# Patient Record
Sex: FEMALE | Race: BLACK OR AFRICAN AMERICAN | Employment: OTHER | URBAN - METROPOLITAN AREA
[De-identification: names, ages, dates, MRNs, and addresses within clinical notes are randomized per-mention and may not be internally consistent; named-entity substitution may affect disease eponyms.]

---

## 2019-02-09 ENCOUNTER — HOSPITAL ENCOUNTER (OUTPATIENT)
Dept: MAMMOGRAPHY | Age: 69
Discharge: HOME | End: 2019-02-09
Attending: FAMILY MEDICINE
Payer: MEDICARE

## 2019-02-09 DIAGNOSIS — Z12.39 BREAST SCREENING: ICD-10-CM

## 2019-02-09 PROCEDURE — 77067 SCR MAMMO BI INCL CAD: CPT

## 2019-02-27 ENCOUNTER — HOSPITAL ENCOUNTER (OUTPATIENT)
Dept: CT IMAGING | Age: 69
Discharge: HOME | End: 2019-02-27
Attending: OTOLARYNGOLOGY
Payer: MEDICARE

## 2019-02-27 DIAGNOSIS — H93.A1 PULSATILE TINNITUS, RIGHT EAR: ICD-10-CM

## 2019-02-27 PROCEDURE — 70480 CT ORBIT/EAR/FOSSA W/O DYE: CPT

## 2022-10-13 ENCOUNTER — TRANSCRIBE ORDERS (OUTPATIENT)
Dept: SCHEDULING | Age: 72
End: 2022-10-13

## 2022-10-13 DIAGNOSIS — Z12.31 VISIT FOR SCREENING MAMMOGRAM: Primary | ICD-10-CM

## 2022-10-18 ENCOUNTER — HOSPITAL ENCOUNTER (OUTPATIENT)
Dept: MAMMOGRAPHY | Age: 72
Discharge: HOME | End: 2022-10-21
Payer: MEDICARE

## 2022-10-18 DIAGNOSIS — Z12.31 VISIT FOR SCREENING MAMMOGRAM: ICD-10-CM

## 2022-10-18 PROCEDURE — 77063 BREAST TOMOSYNTHESIS BI: CPT

## 2023-07-03 ENCOUNTER — TRANSCRIBE ORDERS (OUTPATIENT)
Dept: SCHEDULING | Age: 73
End: 2023-07-03

## 2023-07-03 DIAGNOSIS — Z12.31 SCREENING MAMMOGRAM FOR HIGH-RISK PATIENT: Primary | ICD-10-CM

## 2023-10-23 ENCOUNTER — HOSPITAL ENCOUNTER (OUTPATIENT)
Dept: MAMMOGRAPHY | Age: 73
Discharge: HOME | End: 2023-10-26
Payer: MEDICARE

## 2023-10-23 VITALS — HEIGHT: 63 IN

## 2023-10-23 DIAGNOSIS — Z12.31 SCREENING MAMMOGRAM FOR HIGH-RISK PATIENT: ICD-10-CM

## 2023-10-23 PROCEDURE — 77063 BREAST TOMOSYNTHESIS BI: CPT

## 2024-09-26 ENCOUNTER — TRANSCRIBE ORDERS (OUTPATIENT)
Dept: SCHEDULING | Age: 74
End: 2024-09-26

## 2024-09-26 DIAGNOSIS — Z12.31 SCREENING MAMMOGRAM FOR HIGH-RISK PATIENT: Primary | ICD-10-CM

## 2024-11-01 ENCOUNTER — HOSPITAL ENCOUNTER (OUTPATIENT)
Dept: PHYSICAL THERAPY | Age: 74
Setting detail: RECURRING SERIES
Discharge: HOME | End: 2024-11-04
Payer: MEDICARE

## 2024-11-01 DIAGNOSIS — I89.0 LYMPHEDEMA: Primary | ICD-10-CM

## 2024-11-01 PROCEDURE — 97165 OT EVAL LOW COMPLEX 30 MIN: CPT

## 2024-11-01 NOTE — THERAPY EVALUATION
Krista Santizo  : 1950  Primary: [employer] (Medicare Managed)  Secondary:  [facility] @ [facility]  [address] [place] [address]  Phone: [phone]  Fax: [phone] Plan Frequency: 2,2,2,2    Certification Period Expiration Date: 25        Plan of Care/Certification Expiration Date:  Certification Period Expiration Date: 25      Frequency/Duration: Plan Frequency: 2,2,2,2      Time In/Out:   Time In: 1000  Time Out: 1100    OT Visit Info:  Plan Frequency: 2,2,2,2          OUTPATIENT OCCUPATIONAL THERAPY:Initial Assessment 2024  Episode: (BLE lymphedema)           Treatment Diagnosis:    Lymphedema  Medical/Referring Diagnosis:    Localized swelling of both lower extremities   No admission diagnoses are documented for this encounter.   Referring Physician:  Azeem Borjas PA MD Orders:  OT Eval and Treat   Return MD Appt:  TBD  Date of Onset:  Onset Date: 21     Allergies:  Hydrocodone-acetaminophen and Penicillins  Restrictions/Precautions:    None      Medications Last Reviewed:  2024     SUBJECTIVE   History of Injury/Illness (Reason for Referral):    TODAY:   10/7/2024 (Dr Renteria and SEAN Centeno: 3 mo follow up for BLE edema.  Patient reports hives upon wearing over-the-counter compression stockings.  She has therefore not been wearing them.  She has not been fitted professionally for stockings yet.  Continues to report scattered sharp and cramping pains in various areas of her legs, she is wondering if this could be due to arthritis.  Followed up on previous test including YUDI and echo which were both unremarkable.  It is likely that her edema is secondary to flatfeet, inhibited gait without full calf pump and more sedentary lifestyle.  Patient reports previously walking about 4 miles a day, she has since stopped doing this and spends most of her time working as a  which is mostly sitting.

## 2024-11-09 ENCOUNTER — HOSPITAL ENCOUNTER (OUTPATIENT)
Dept: MAMMOGRAPHY | Age: 74
Discharge: HOME | End: 2024-11-12
Payer: MEDICARE

## 2024-11-09 DIAGNOSIS — Z12.31 ENCOUNTER FOR SCREENING MAMMOGRAM FOR HIGH-RISK PATIENT: ICD-10-CM

## 2024-11-09 PROCEDURE — 77063 BREAST TOMOSYNTHESIS BI: CPT

## 2024-11-11 ENCOUNTER — HOSPITAL ENCOUNTER (OUTPATIENT)
Dept: PHYSICAL THERAPY | Age: 74
Setting detail: RECURRING SERIES
Discharge: HOME | End: 2024-11-14
Payer: MEDICARE

## 2024-11-11 DIAGNOSIS — I89.0 LYMPHEDEMA: Primary | ICD-10-CM

## 2024-11-11 PROCEDURE — 97535 SELF CARE MNGMENT TRAINING: CPT

## 2024-11-11 PROCEDURE — 97140 MANUAL THERAPY 1/> REGIONS: CPT

## 2024-11-14 NOTE — PROGRESS NOTES
Aquaphor for skin care. Cotton liners. 3 bandages each leg. Remove if painful.         Lymphedema:  PRETREATMENT AFFECTED LIMB(s): LOWER EXTREMITY       Date:   11/1/24           Right / Left              Groin    []      []              8 inches    []      []              4 inches    []      []           PoplitealSpace    []      [] 39  40            8 inches    []      [] 35  36            4 inches    []      [] 29  30            Ankle    []      [] 26  25            Instep    []      [] 20.5  21         Measurements are taken in centimeters:  2.54 cm = 1 inch   Cumulative circumferential volumetric graph measurements  Date  11/1/24             RLE total size 149.5             LLE total size 152                    Treatment/Session Summary:    Treatment Assessment:   Consider ordering velcro next session on 11/15/2024 if she reduces as planned.    Communication/Consultation:  None today  Equipment provided today:  None  Recommendations/Intent for next treatment session: Next visit will focus on CDT and MLD.    >Total Treatment Billable Duration: 60 minutes  OT Individual Minutes  Time In: 0900  Time Out: 1000  Minutes: 60    Ranulfo Sanchez OT     Charge Capture   Events  [employer]  Appt Desk  Attendance Report     Future Appointments   Date Time Provider Department Center   11/15/2024 10:00 AM Ranulfo Sanchez OT [facility]   11/19/2024  8:00 AM Ranulfo Sanchez OT [facility]   11/20/2024  8:00 AM Ranulfo Sanchez OT [facility]   12/4/2024 10:00 AM Ranulfo Sanchez OT [facility]   12/6/2024  9:00 AM Ranulfo Sanchez OT [facility]

## 2024-11-15 ENCOUNTER — HOSPITAL ENCOUNTER (OUTPATIENT)
Dept: PHYSICAL THERAPY | Age: 74
Setting detail: RECURRING SERIES
Discharge: HOME | End: 2024-11-18
Payer: MEDICARE

## 2024-11-15 PROCEDURE — 97535 SELF CARE MNGMENT TRAINING: CPT

## 2024-11-15 PROCEDURE — 97140 MANUAL THERAPY 1/> REGIONS: CPT

## 2024-11-15 NOTE — PROGRESS NOTES
Aquaphor for skin care. Cotton liners. 3 bandages each leg. Remove if painful.         Lymphedema:  PRETREATMENT AFFECTED LIMB(s): LOWER EXTREMITY       Date:   11/1/24           Right / Left              Groin    []      []              8 inches    []      []              4 inches    []      []           PoplitealSpace    []      [] 39  40            8 inches    []      [] 35  36            4 inches    []      [] 29  30            Ankle    []      [] 26  25            Instep    []      [] 20.5  21         Measurements are taken in centimeters:  2.54 cm = 1 inch   Cumulative circumferential volumetric graph measurements  Date  11/1/24             RLE total size 149.5             LLE total size 152                    Treatment/Session Summary:    Treatment Assessment:   Consider ordering velcro next session on 11/15/2024 and was able to order Compreflex Transition calf this date.   Communication/Consultation:  None today  Equipment provided today:  None  Recommendations/Intent for next treatment session: Next visit will focus on CDT and MLD.    >Total Treatment Billable Duration: 60 minutes  OT Individual Minutes  Time In: 0905  Time Out: 1000  Minutes: 55    Ranulfo Sanchez OT     Charge Capture   Events  [email or web address] Portal  Appt Desk  Attendance Report     Future Appointments   Date Time Provider Department Center   11/19/2024  8:00 AM Ranulfo Sanchez OT [facility]   11/20/2024  8:00 AM Ranulfo Sanchez OT [facility]   12/4/2024 10:00 AM Ranuflo Sanchez OT [facility]   12/6/2024  9:00 AM Ranulfo Sanchez OT [facility]

## 2024-11-19 ENCOUNTER — HOSPITAL ENCOUNTER (OUTPATIENT)
Dept: PHYSICAL THERAPY | Age: 74
Setting detail: RECURRING SERIES
Discharge: HOME | End: 2024-11-22
Payer: MEDICARE

## 2024-11-19 PROCEDURE — 97140 MANUAL THERAPY 1/> REGIONS: CPT

## 2024-11-19 PROCEDURE — 97535 SELF CARE MNGMENT TRAINING: CPT

## 2024-11-20 ENCOUNTER — HOSPITAL ENCOUNTER (OUTPATIENT)
Dept: PHYSICAL THERAPY | Age: 74
Setting detail: RECURRING SERIES
Discharge: HOME | End: 2024-11-23
Payer: MEDICARE

## 2024-11-20 PROCEDURE — 97535 SELF CARE MNGMENT TRAINING: CPT

## 2024-11-20 PROCEDURE — 97140 MANUAL THERAPY 1/> REGIONS: CPT

## 2024-11-20 NOTE — PROGRESS NOTES
Krista Santizo  : 1950  Primary: [employer] (Medicare Managed)  Secondary:  [facility] @ [facility]  [address] [place] [address]  Phone: [phone]  Fax: [phone]    Plan of Care/Certification Expiration Date:  Certification Period Expiration Date: 25      Frequency/Duration:   Plan Frequency: 2,2,2,2      Time In/Out:   Time In: 0800  Time Out: 0845    OT Visit Info:  Plan Frequency: 2,2,2,2          OUTPATIENT OCCUPATIONAL THERAPY: Treatment Note 2024  Episode: (BLE lymphedema)         Treatment Diagnosis:    Lymphedema  Medical/Referring Diagnosis:   Localized swelling of both lower extremities   No admission diagnoses are documented for this encounter.   Referring Physician:  Azeem Borjas PA MD Orders:  OT Eval and Treat   Return MD Appt:  TBD   Date of Onset:  Onset Date: 21     Allergies:  Hydrocodone-acetaminophen and Penicillins  Restrictions/Precautions:   None      Medications Last Reviewed:  2024     Interventions Planned (Treatment may consist of any combination of the following):     See Assessment Note      Subjective Comments:   No complaints   >Initial Pain Level:      2/10  >Post Session Pain Level:      2/10  Medications Last Reviewed:  2024  Updated Objective Findings:  None Today  Treatment   Manual Lymph Drainage: 45 minutes  Lymph Nodes:    Cervical Supraclavicular Axillary Abdominal Inguinal Popliteal Antecubital   RIGHT []     []     []     []     []     []     []       LEFT []     []     []     []     []     []     []         Anastamoses:   Axillo-axillary Inguino-inguinal Axillo-inguinal Inguino-axillary   ANTERIOR []     []     []     []       POSTERIOR []     []     []     []       RIGHT []     []     []     []       LEFT []     []     []     []         Limbs:   []    RUE     []    LUE     [x]    RLE    [x]    LLE    Self Care: (15 minutes): BLE reduction bandages applied.

## 2024-12-04 ENCOUNTER — HOSPITAL ENCOUNTER (OUTPATIENT)
Dept: PHYSICAL THERAPY | Age: 74
Setting detail: RECURRING SERIES
Discharge: HOME | End: 2024-12-07
Payer: MEDICARE

## 2024-12-04 PROCEDURE — 97140 MANUAL THERAPY 1/> REGIONS: CPT

## 2024-12-04 PROCEDURE — 97535 SELF CARE MNGMENT TRAINING: CPT

## 2024-12-04 NOTE — THERAPY DISCHARGE
Krista Santizo  : 1950  Primary: [employer] (Medicare Managed)  Secondary:  [facility]  [address] [place] [address]  Phone: [phone]  Fax: [phone]    Plan of Care/Certification Expiration Date:  Certification Period Expiration Date: 25      Frequency/Duration:   Plan Frequency: 2,2,2,2      Time In/Out:   Time In: 1000  Time Out: 1100    OT Visit Info:  Plan Frequency: 2,2,2,2          OUTPATIENT OCCUPATIONAL THERAPY: Treatment Note 2024  Episode: (BLE lymphedema)         Treatment Diagnosis:    Lymphedema  Medical/Referring Diagnosis:   Localized swelling of both lower extremities   No admission diagnoses are documented for this encounter.   Referring Physician:  Azeem Borjas PA MD Orders:  OT Eval and Treat   Return MD Appt:  TBD   Date of Onset:  Onset Date: 21     Allergies:  Hydrocodone-acetaminophen and Penicillins  Restrictions/Precautions:   None      Medications Last Reviewed:  2024     Interventions Planned (Treatment may consist of any combination of the following):     See Assessment Note      Subjective Comments:   No complaints   >Initial Pain Level:      2/10  >Post Session Pain Level:      2/10  Medications Last Reviewed:  2024  Updated Objective Findings:  None Today  Treatment   Manual Lymph Drainage: 30 minutes  Lymph Nodes:    Cervical Supraclavicular Axillary Abdominal Inguinal Popliteal Antecubital   RIGHT []     []     []     []     []     []     []       LEFT []     []     []     []     []     []     []         Anastamoses:   Axillo-axillary Inguino-inguinal Axillo-inguinal Inguino-axillary   ANTERIOR []     []     []     []       POSTERIOR []     []     []     []       RIGHT []     []     []     []       LEFT []     []     []     []         Limbs:   []    RUE     []    LUE     [x]    RLE    [x]    LLE    Self Care: (10 minutes): Continued education with halle and

## 2024-12-06 ENCOUNTER — APPOINTMENT (OUTPATIENT)
Dept: PHYSICAL THERAPY | Age: 74
End: 2024-12-06
Payer: MEDICARE

## 2025-06-16 ENCOUNTER — HOSPITAL ENCOUNTER (OUTPATIENT)
Dept: PHYSICAL THERAPY | Age: 75
Setting detail: RECURRING SERIES
Discharge: HOME | End: 2025-06-19
Payer: MEDICARE

## 2025-06-16 DIAGNOSIS — M25.562 ACUTE PAIN OF LEFT KNEE: ICD-10-CM

## 2025-06-16 DIAGNOSIS — M62.81 MUSCLE WEAKNESS (GENERALIZED): ICD-10-CM

## 2025-06-16 DIAGNOSIS — M25.662 STIFFNESS OF LEFT KNEE, NOT ELSEWHERE CLASSIFIED: ICD-10-CM

## 2025-06-16 DIAGNOSIS — Z96.652 STATUS POST TOTAL LEFT KNEE REPLACEMENT: Primary | ICD-10-CM

## 2025-06-16 PROCEDURE — 97161 PT EVAL LOW COMPLEX 20 MIN: CPT

## 2025-06-16 ASSESSMENT — KOOS JR
STRAIGHTENING KNEE FULLY: MILD
KOOS JR TOTAL INTERVAL SCORE: 63.776
STANDING UPRIGHT: MILD
HOW SEVERE IS YOUR KNEE STIFFNESS AFTER FIRST WAKING IN MORNING: MODERATE
TWISING OR PIVOTING ON KNEE: MODERATE
RISING FROM SITTING: MILD
BENDING TO THE FLOOR TO PICK UP OBJECT: MILD
GOING UP OR DOWN STAIRS: MILD

## 2025-06-16 ASSESSMENT — PAIN SCALES - GENERAL: PAINLEVEL_OUTOF10: 2

## 2025-06-16 NOTE — THERAPY EVALUATION
Krista Santizo  : 1950  Primary: [facility] [employer]vantage (Medicare Managed)  Secondary:  [facility] @ [address] [place] [address]  Phone: [phone]  Fax: [phone] Plan Frequency: 2 x week for 8 weeks  Plan of Care/Certification Expiration Date: 25        Plan of Care/Certification Expiration Date:  Plan of Care/Certification Expiration Date: 25    Frequency/Duration: Plan Frequency: 2 x week for 8 weeks      Time In/Out:   Time In: 1012  Time Out: 1100      PT Visit Info:         Visit Count:  1                OUTPATIENT PHYSICAL THERAPY:             Initial Assessment 2025               Episode (S/P L TKA on 25)         Treatment Diagnosis:    Status post total left knee replacement  Muscle weakness (generalized)  Stiffness of left knee, not elsewhere classified  Acute pain of left knee  Medical/Referring Diagnosis:    Hx of total knee arthroplasty, left    Referring Physician:  Elio Springer MD MD Orders:  PT Eval and Treat   Return MD Appt:  25    Date of Onset:  Onset Date: 25    Allergies:  Hydrocodone-acetaminophen and Penicillins  Restrictions/Precautions:    None      Medications Last Reviewed: 2025     SUBJECTIVE   History of Injury/Illness (Reason for Referral):  S/p L TKA on 2025. Pt reporting prior to surgery she had increased pain with prolonged walking and standing. She reports increased stiffness and weakness throughout L LE now after surgery.   Patient Stated Goal(s):  \"Return to performing normal activities with no pain or stiffness\"  Initial Pain Level:      2/10   Post Session Pain Level:     1/10  Past Medical History/Comorbidities:   Ms. Santizo  has a past medical history of Ear problems, Hearing reduced, History of multiple allergies, Sinus problem, and Tinnitus.  Ms. Santizo  has a past surgical history that includes Breast biopsy (Right); Breast cyst

## 2025-07-14 ENCOUNTER — HOSPITAL ENCOUNTER (OUTPATIENT)
Dept: PHYSICAL THERAPY | Age: 75
Setting detail: RECURRING SERIES
Discharge: HOME | End: 2025-07-17
Payer: MEDICARE

## 2025-07-14 DIAGNOSIS — M25.561 CHRONIC PAIN OF RIGHT KNEE: Primary | ICD-10-CM

## 2025-07-14 DIAGNOSIS — Z96.652 STATUS POST TOTAL LEFT KNEE REPLACEMENT: ICD-10-CM

## 2025-07-14 DIAGNOSIS — M25.562 ACUTE PAIN OF LEFT KNEE: ICD-10-CM

## 2025-07-14 DIAGNOSIS — M62.81 MUSCLE WEAKNESS (GENERALIZED): ICD-10-CM

## 2025-07-14 DIAGNOSIS — G89.29 CHRONIC PAIN OF RIGHT KNEE: Primary | ICD-10-CM

## 2025-07-14 DIAGNOSIS — M25.662 STIFFNESS OF LEFT KNEE, NOT ELSEWHERE CLASSIFIED: ICD-10-CM

## 2025-07-14 PROCEDURE — 97140 MANUAL THERAPY 1/> REGIONS: CPT

## 2025-07-14 PROCEDURE — 97110 THERAPEUTIC EXERCISES: CPT

## 2025-07-14 NOTE — PROGRESS NOTES
Krista Santizo  : 1950  Primary: [facility] [employer]vantage (Medicare Managed)  Secondary:  [facility] @ [address] [place] [address]  Phone: [phone]  Fax: [phone] Plan Frequency: 2 x week for 8 weeks    Plan of Care/Certification Expiration Date: 25        Plan of Care/Certification Expiration Date:  Plan of Care/Certification Expiration Date: 25    Frequency/Duration: Plan Frequency: 2 x week for 8 weeks      Time In/Out:          PT Visit Info:         Visit Count:  2    OUTPATIENT PHYSICAL THERAPY:   Treatment Note 2025       Episode  (S/P L TKA on 25)               Treatment Diagnosis:    Chronic pain of right knee  Muscle weakness (generalized)  Status post total left knee replacement  Acute pain of left knee  Stiffness of left knee, not elsewhere classified  Medical/Referring Diagnosis:    Hx of total knee arthroplasty, left    Referring Physician:  Elio Springer MD MD Orders:  PT Eval and Treat   Return MD Appt:  25   Date of Onset:  Onset Date: 25     Allergies:   Hydrocodone-acetaminophen and Penicillins  Restrictions/Precautions:   None      Interventions Planned (Treatment may consist of any combination of the following):     See Assessment Note    Subjective Comments:   Pt reporting she is doing well and has decreased overall pain and improvement with ROM.   Initial Pain Level:      2/10  Post Session Pain Level:       1/10  Medications Last Reviewed: 2025  Updated Objective Findings:  ROM 0-112  Treatment   MANUAL THERAPY: (20 minutes):   Joint mobilization and Soft tissue mobilization was utilized and necessary because of the patient's restricted joint motion, loss of articular motion, and restricted motion of soft tissue.      Date:  25 Date:   Date:     Activity/Exercise Parameters Parameters Parameters   Soft Tissue Supine: STM and trigger point release to L vastus lateralis and

## 2025-08-11 ENCOUNTER — HOSPITAL ENCOUNTER (OUTPATIENT)
Dept: PHYSICAL THERAPY | Age: 75
Setting detail: RECURRING SERIES
Discharge: HOME | End: 2025-08-14
Payer: MEDICARE

## 2025-08-11 DIAGNOSIS — M62.81 MUSCLE WEAKNESS (GENERALIZED): Primary | ICD-10-CM

## 2025-08-11 DIAGNOSIS — M25.662 STIFFNESS OF LEFT KNEE, NOT ELSEWHERE CLASSIFIED: ICD-10-CM

## 2025-08-11 DIAGNOSIS — Z96.652 STATUS POST TOTAL LEFT KNEE REPLACEMENT: ICD-10-CM

## 2025-08-11 DIAGNOSIS — M25.562 ACUTE PAIN OF LEFT KNEE: ICD-10-CM

## 2025-08-11 PROCEDURE — 97110 THERAPEUTIC EXERCISES: CPT

## 2025-08-11 PROCEDURE — 97140 MANUAL THERAPY 1/> REGIONS: CPT

## 2025-08-11 ASSESSMENT — KOOS JR
KOOS JR TOTAL INTERVAL SCORE: 91.975
HOW SEVERE IS YOUR KNEE STIFFNESS AFTER FIRST WAKING IN MORNING: MILD